# Patient Record
Sex: FEMALE | Race: WHITE | NOT HISPANIC OR LATINO | Employment: FULL TIME | URBAN - METROPOLITAN AREA
[De-identification: names, ages, dates, MRNs, and addresses within clinical notes are randomized per-mention and may not be internally consistent; named-entity substitution may affect disease eponyms.]

---

## 2023-10-03 ENCOUNTER — ANCILLARY PROCEDURE (OUTPATIENT)
Dept: RADIOLOGY | Facility: CLINIC | Age: 48
End: 2023-10-03
Payer: COMMERCIAL

## 2023-10-03 ENCOUNTER — OFFICE VISIT (OUTPATIENT)
Dept: ORTHOPEDIC SURGERY | Facility: CLINIC | Age: 48
End: 2023-10-03
Payer: COMMERCIAL

## 2023-10-03 VITALS — HEIGHT: 66 IN | BODY MASS INDEX: 47.09 KG/M2 | WEIGHT: 293 LBS

## 2023-10-03 DIAGNOSIS — M25.561 RIGHT KNEE PAIN: ICD-10-CM

## 2023-10-03 DIAGNOSIS — M17.12 PRIMARY OSTEOARTHRITIS OF LEFT KNEE: ICD-10-CM

## 2023-10-03 DIAGNOSIS — M25.561 RIGHT KNEE PAIN: Primary | ICD-10-CM

## 2023-10-03 PROCEDURE — 73562 X-RAY EXAM OF KNEE 3: CPT | Mod: RT

## 2023-10-03 PROCEDURE — 73560 X-RAY EXAM OF KNEE 1 OR 2: CPT | Mod: RIGHT SIDE | Performed by: RADIOLOGY

## 2023-10-03 PROCEDURE — 1036F TOBACCO NON-USER: CPT | Performed by: FAMILY MEDICINE

## 2023-10-03 PROCEDURE — 73562 X-RAY EXAM OF KNEE 3: CPT | Mod: RIGHT SIDE | Performed by: RADIOLOGY

## 2023-10-03 PROCEDURE — 99204 OFFICE O/P NEW MOD 45 MIN: CPT | Performed by: FAMILY MEDICINE

## 2023-10-03 RX ORDER — MELOXICAM 15 MG/1
15 TABLET ORAL DAILY
Qty: 30 TABLET | Refills: 0 | Status: SHIPPED | OUTPATIENT
Start: 2023-10-03 | End: 2023-11-07 | Stop reason: SDUPTHER

## 2023-10-03 NOTE — PROGRESS NOTES
History of Present Illness   Chief Complaint   Patient presents with    Right Knee - Pain       The patient is 47 y.o. female here with a complaint of right knee pain.  Patient says initial onset of symptoms around 6 months ago when she fell down some stairs landing on her knee, did not seek any medical attention at that time, says symptoms were more mild, tolerable.  Around 2 weeks ago she was at her daughter's senior night, says she was bending down as she stood up she felt a more sharp severe pain over the medial aspect of her knee which has persisted, she has been taking Advil for pain control with mild improvement.  She denies any swelling, she does admit to some popping/clicking, feels slightly unstable.  She denies any history of any significant right knee problems in the past, does have some ongoing issues with her left knee but says now her right knee is more bothersome than her left.  Patient works a desk job, she gets pain, stiffness when sitting for prolonged periods going to a standing position.  She does have some difficulty with stairs    No past medical history on file.    Medication Documentation Review Audit    **Prior to Admission medications have not yet been reviewed**         No Known Allergies    Social History     Socioeconomic History    Marital status:      Spouse name: Not on file    Number of children: Not on file    Years of education: Not on file    Highest education level: Not on file   Occupational History    Not on file   Tobacco Use    Smoking status: Never    Smokeless tobacco: Never   Substance and Sexual Activity    Alcohol use: Not on file     Comment: rarely    Drug use: Never    Sexual activity: Not on file   Other Topics Concern    Not on file   Social History Narrative    Not on file     Social Determinants of Health     Financial Resource Strain: Not on file   Food Insecurity: Not on file   Transportation Needs: Not on file   Physical Activity: Not on file    Stress: Not on file   Social Connections: Not on file   Intimate Partner Violence: Not on file   Housing Stability: Not on file       No past surgical history on file.       Review of Systems   GENERAL: Negative  GI: Negative  MUSCULOSKELETAL: See HPI  SKIN: Negative  NEURO:  Negative     Physical Exam:    General/Constitutional: well appearing, no distress, appears stated age.  Obese.  HEENT: sclera clear  Respiratory: non labored breathing  Vascular: No edema, swelling or tenderness, except as noted in detailed exam.  Integumentary: No impressive skin lesions present, except as noted in detailed exam.  Neurological:  Alert and oriented   Psychological:  Normal mood and affect.  Musculoskeletal: Normal, except as noted in detailed exam and in HPI.  Antalgic gait, unassisted    Right knee: Normal appearance, no swelling, no redness or warmth, no joint effusion is present.  There is some focal medial joint line tenderness to palpation.  Range of motion from 0 to 120 degrees with pain at endrange of flexion.  Positive Yury with medial discomfort.  Negative Lachman, negative posterior drawer.  Stable to varus and valgus stress    Left knee: Normal appearance, no swelling, mild medial joint tenderness palpation.  Range of motion from 0 to 120 degrees with out pain, negative Childs, negative Lachman, negative posterior drawer           Imaging  X-rays of right knee obtained today and independently reviewed, there is some mild medial joint space narrowing on the right knee, no acute abnormalities are seen, AP view of left knee shows some moderate to advanced medial compartment predominant degenerative changes with joint space narrowing, osteophytosis     Assessment   1. Right knee pain  XR knee right 3 views    Referral to Physical Therapy    meloxicam (Mobic) 15 mg tablet      2. Primary osteoarthritis of left knee  Referral to Physical Therapy        Right knee pain thought to be potential aggravation of  underlying mild osteoarthritis versus consideration of medial meniscus tear.  Mild left knee discomfort likely secondary to moderate to advanced underlying osteoarthritis    Plan  Discussed diagnosis, further work-up and treatment.  For the right knee she was interested in conservative management for the time being, referral to physical therapy was placed for her right knee as well as some chronic ongoing left knee pain.  She was given a prescription for meloxicam to assist in pain control, she will stop all other NSAIDs.  She will modify activities based on symptoms.  We will plan for follow-up in 6 to 8 weeks.  If ongoing right knee pain could consider MRI for further evaluation of medial meniscus.  Could also consider injection therapy for ongoing pain.

## 2023-11-02 ENCOUNTER — EVALUATION (OUTPATIENT)
Dept: PHYSICAL THERAPY | Facility: CLINIC | Age: 48
End: 2023-11-02
Payer: COMMERCIAL

## 2023-11-02 DIAGNOSIS — M25.562 PAIN IN BOTH KNEES, UNSPECIFIED CHRONICITY: Primary | ICD-10-CM

## 2023-11-02 DIAGNOSIS — M25.561 RIGHT KNEE PAIN: ICD-10-CM

## 2023-11-02 DIAGNOSIS — M17.12 PRIMARY OSTEOARTHRITIS OF LEFT KNEE: ICD-10-CM

## 2023-11-02 DIAGNOSIS — M25.561 PAIN IN BOTH KNEES, UNSPECIFIED CHRONICITY: Primary | ICD-10-CM

## 2023-11-02 PROCEDURE — 97161 PT EVAL LOW COMPLEX 20 MIN: CPT | Mod: GP | Performed by: PHYSICAL THERAPIST

## 2023-11-02 PROCEDURE — 97110 THERAPEUTIC EXERCISES: CPT | Mod: GP | Performed by: PHYSICAL THERAPIST

## 2023-11-02 ASSESSMENT — ENCOUNTER SYMPTOMS
DEPRESSION: 0
OCCASIONAL FEELINGS OF UNSTEADINESS: 0
LOSS OF SENSATION IN FEET: 0

## 2023-11-02 ASSESSMENT — PAIN SCALES - GENERAL: PAINLEVEL_OUTOF10: 1

## 2023-11-02 ASSESSMENT — PATIENT HEALTH QUESTIONNAIRE - PHQ9
SUM OF ALL RESPONSES TO PHQ9 QUESTIONS 1 AND 2: 0
2. FEELING DOWN, DEPRESSED OR HOPELESS: NOT AT ALL
1. LITTLE INTEREST OR PLEASURE IN DOING THINGS: NOT AT ALL

## 2023-11-02 NOTE — PROGRESS NOTES
"Physical Therapy    Physical Therapy Lower Extremity Evaluation    Patient Name: Kasey Johnson  MRN: [id number]  Today's Date: 11/2/2023  Time Calculation  Start Time: 1458  Stop Time: 1532  Time Calculation (min): 34 min    Current Problem  Problem List Items Addressed This Visit             ICD-10-CM    Bilateral knee pain - Primary M25.561, M25.562    Relevant Orders    Follow Up In Physical Therapy     Other Visit Diagnoses         Codes    Right knee pain     M25.561    Primary osteoarthritis of left knee     M17.12               Precautions  Precautions  Precautions Comment: None       Pain  Pain Score: 1  Pain at worst: 7/10    SUBJECTIVE:   Chief complaint:  Bilateral knee pain.  Onset about 7 mo ago after falling down stairs and landing on her R knee. She fell down 8 stairs.   She then had flare up after bending down and standing up in which she experienced a sharp pain   She was dx with a bakers cyst in L knee. Twisted knee while in [place] years [place]     Knee pain progressively worsened.   Notes about 1 month ago she was not able to walk on it  +warmth, tightness in R knee   +bilateral knee swelling   +Clicking R knee with pain   Pain feels like it is \"in\" the knee   Recommended injection in knee by MD but stated she needed to try PT first    Aggravating factors:  Driving-particularly stopping quickly  WB  Difficulty finding comfortable position at night    Alleviating factors:  Pain medication-Meloxicam     Imaging:  XR R knee 10/3/23:    FINDINGS:   Right knee:   No acute fracture or malalignment.   Mild medial compartment osteoarthrosis with joint space loss and   osteophytes noted. Small knee joint effusion.       AP view radiograph of the left knee demonstrates moderate to severe   medial and mild lateral compartment degenerative changes.         1. Mild medial compartment osteoarthrosis of the right knee. Small   right knee joint effusion.   2. Moderate to severe medial and mild lateral compartment "   osteoarthrosis of the left knee.     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     Patients goal:  Walk without pain     Prior tx:  None     OBJECTIVE:    Lower Extremity ROM: (WNL unless documented below)   ROM in Degrees  RIGHT LEFT   Knee Extension 0 0   Knee Flexion 121 132     Lower Extremity Strength: (WNL unless documented below)   MMT 5/5 max  RIGHT LEFT   Knee Extension 4- with pain  4+   Knee Flexion 4 5     Lower Extremity Flexibility: (WNL unless documented below)   Flexibility  RIGHT LEFT   Quad     Hamstring  25* pain  20*   Hip flexor      Piriformis      ITB      Gastroc  Mod loss  Mod loss   Soleus  Mod loss Mod loss      +Click in R knee with pain   +R retropatellar crepitus with knee ROM     Gait mechanics: antalgic     Special tests: (WNL unless documented below)     KNEE RIGHT LEFT   Yury's     Thessaly's  + -   Joint Line Tenderness - -   End Range Flexion +    End Range Extension  +    Anterior Drawer      Posterior Drawer     Valgus Stress      Varus Stress         TREATMENT:  Initial evaluation completed. Issued and reviewed HEP with pt that included:  Access Code: [email or web address]  URL: [email or web address]  Date: 11/02/2023  Prepared by: Vangie Russo    Exercises  - Supine Quad Set  - 1 x daily - 7 x weekly - 2-3 sets - 10 reps - 3 seconds  hold  - Active Straight Leg Raise with Quad Set  - 1 x daily - 7 x weekly - 2-3 sets - 10 reps  - Supine Heel Slide  - 1 x daily - 7 x weekly - 2-3 sets - 10 reps  - Seated Long Arc Quad (90-45 Degree Range)  - 1 x daily - 7 x weekly - 2-3 sets - 10 reps  - Seated Hamstring Stretch  - 1 x daily - 7 x weekly - 2 reps - 30 seconds hold    Outcome Measure:  Other Measures  Lower Extremity  Funtional Score (LEFS): 32/80    ASSESSEMENT  The pt presents with signs and symptoms consistent with the physical therapy diagnosis of R knee pain.   Pt demonstrates decreased ROM R>L with reported pain. She also has decreased strength with reported pain on R>L. +Tests and measures consistent with possible R meniscal px. Pt is concerned with intensity and frequency of pain. The pt will benefit from skilled physical therapy to reduce impairments in order to return to prior level of function, reduce pain, increase strength and ROM and restore gait/balance.     The physical therapy prognosis is good for the patient to achieve their goals.   The pt tolerated therapy treatment today well with no adverse effects.  Barriers to therapy include:  none     PLAN  The pt will be seen 1-2 time(s) a week for 6-8 weeks.      The pt has been educated about the risks and benefits of physical therapy including manual therapy treatments and gives consent for treatment.     The patient will benefit from physical therapy treatment to include: therapeutic exercises, therapeutic activities, neurological re-education, manual therapy, modalities, and a home exercise program.     Goals:  Active       PT Problem       Reduce pain at worst to 3/10 with all prior activity.        Start:  11/02/23    Expected End:  01/31/24            Increase ROM/flexibility to WFL to perform daily functional activities including amb, transfers, stair negotiation        Start:  11/02/23    Expected End:  01/31/24            Increase by > or = 1/2 mm grade to improve transfers, stair negotiation, gait mechanics without increased pain/compensation         Start:  11/02/23    Expected End:  01/31/24            Pt to score an increase of 10 points or > on LEFS to display overall increased function.         Start:  11/02/23    Expected End:  01/31/24            Patient will demonstrate independence in home program for support of progression       Start:  11/02/23     Expected End:  01/31/24

## 2023-11-02 NOTE — LETTER
November 2, 2023     Patient: Kasey Johnson   YOB: 1975   Date of Visit: 11/2/2023       To Whom It May Concern:    It is my medical opinion that Kasey Johnson {Work release (duty restriction):87828}.    If you have any questions or concerns, please don't hesitate to call.         Sincerely,        Vangie Russo, PT    CC: No Recipients

## 2023-11-02 NOTE — LETTER
November 2, 2023     Patient: Kasey Johnson   YOB: 1975   Date of Visit: 11/2/2023       To Whom it May Concern:    Kasey Johnson was seen in my clinic on 11/2/2023. She {Return to school/sport:60036}.    If you have any questions or concerns, please don't hesitate to call.         Sincerely,          Vangie Russo, PT        CC: No Recipients

## 2023-11-06 ENCOUNTER — TELEPHONE (OUTPATIENT)
Dept: ORTHOPEDIC SURGERY | Facility: CLINIC | Age: 48
End: 2023-11-06
Payer: COMMERCIAL

## 2023-11-07 DIAGNOSIS — M25.561 RIGHT KNEE PAIN: ICD-10-CM

## 2023-11-07 RX ORDER — MELOXICAM 15 MG/1
15 TABLET ORAL DAILY
Qty: 30 TABLET | Refills: 0 | Status: SHIPPED | OUTPATIENT
Start: 2023-11-07 | End: 2023-12-26

## 2023-11-08 ENCOUNTER — TREATMENT (OUTPATIENT)
Dept: PHYSICAL THERAPY | Facility: CLINIC | Age: 48
End: 2023-11-08
Payer: COMMERCIAL

## 2023-11-08 DIAGNOSIS — M25.562 PAIN IN BOTH KNEES, UNSPECIFIED CHRONICITY: ICD-10-CM

## 2023-11-08 DIAGNOSIS — M25.561 PAIN IN BOTH KNEES, UNSPECIFIED CHRONICITY: ICD-10-CM

## 2023-11-08 PROCEDURE — 97110 THERAPEUTIC EXERCISES: CPT | Mod: GP,CQ

## 2023-11-08 ASSESSMENT — PAIN SCALES - GENERAL: PAINLEVEL_OUTOF10: 2

## 2023-11-08 ASSESSMENT — PAIN DESCRIPTION - DESCRIPTORS: DESCRIPTORS: ACHING

## 2023-11-08 ASSESSMENT — PAIN - FUNCTIONAL ASSESSMENT: PAIN_FUNCTIONAL_ASSESSMENT: 0-10

## 2023-11-08 NOTE — PROGRESS NOTES
"Physical Therapy Treatment    Patient Name: Kasey Johnson  MRN: [id number]  Today's Date: 11/8/2023  Time Calculation  Start Time: 1005  Stop Time: 1045  Time Calculation (min): 40 min    Current Problem:  Problem List Items Addressed This Visit             ICD-10-CM    Bilateral knee pain M25.561, M25.562       Subjective   General:   Pt has been at an conference for 2 days so it was a lot of standing/walking/sitting for long hrs. Did not sleep last night d/t knee pain.  R knee pops and niall occasionally and is painful when it does. However, feels better after it pops.   Pain:  Pain Assessment: 0-10  Pain Score: 2  Pain Location: Knee  Pain Descriptors: Aching    Precautions:  Precautions  Precautions Comment: None      Objective   No objective measures taken this visit    Treatment:  Therapeutic exercise  Scifit x5 min   Calf stretch on fitter board x 1 min  BL HSS x 1 min, seated  BL TKE GTB 2x10   R fwd step up 2 B UE support 2x10, 2\"  L step up B UE support 2x10, 6''  L step down B UE support 6'' 2x10 increase height next visit   R step down B UE support 2'' x10   Walking along railing with correct gait x 5 min     Neuromuscular Re-education       Manual       Modalities  Will ice at home    Assessment   Pt very hesitant to perform all exercises d/t fear of R knee pain/buckling. Very hesitant to flex knee with exercises, requiring encouragement from therapist and B UE support. Pt voiced feeling \"pop\" in R knee when performing step ups but voiced relief following. All exercises performed very slowly but able to complete.     Plan    Continue to progress POC as tolerated by patient to improve strength, mobility and overall function  Trial k tape?    Goals:  Active       PT Problem       Reduce pain at worst to 3/10 with all prior activity.        Start:  11/02/23    Expected End:  01/31/24            Increase ROM/flexibility to WFL to perform daily functional activities including amb, transfers, stair " negotiation        Start:  11/02/23    Expected End:  01/31/24            Increase by > or = 1/2 mm grade to improve transfers, stair negotiation, gait mechanics without increased pain/compensation         Start:  11/02/23    Expected End:  01/31/24            Pt to score an increase of 10 points or > on LEFS to display overall increased function.         Start:  11/02/23    Expected End:  01/31/24            Patient will demonstrate independence in home program for support of progression       Start:  11/02/23    Expected End:  01/31/24

## 2023-11-16 ENCOUNTER — DOCUMENTATION (OUTPATIENT)
Dept: PHYSICAL THERAPY | Facility: CLINIC | Age: 48
End: 2023-11-16
Payer: COMMERCIAL

## 2023-11-16 ENCOUNTER — ANCILLARY PROCEDURE (OUTPATIENT)
Dept: RADIOLOGY | Facility: CLINIC | Age: 48
End: 2023-11-16
Payer: COMMERCIAL

## 2023-11-16 ENCOUNTER — APPOINTMENT (OUTPATIENT)
Dept: PHYSICAL THERAPY | Facility: CLINIC | Age: 48
End: 2023-11-16
Payer: COMMERCIAL

## 2023-11-16 DIAGNOSIS — Z12.31 SCREENING MAMMOGRAM FOR BREAST CANCER: ICD-10-CM

## 2023-11-16 PROCEDURE — 77063 BREAST TOMOSYNTHESIS BI: CPT

## 2023-11-16 PROCEDURE — 77067 SCR MAMMO BI INCL CAD: CPT | Performed by: RADIOLOGY

## 2023-11-16 PROCEDURE — 77063 BREAST TOMOSYNTHESIS BI: CPT | Performed by: RADIOLOGY

## 2023-11-16 NOTE — PROGRESS NOTES
"Physical Therapy                 Therapy Communication Note    Patient Name: Kasey Johnson \"Louisa\"  MRN: [id number]  Today's Date: 11/16/2023     Discipline: Physical Therapy    Missed Visit Reason:      Missed Time: Cancel    Comment:  "

## 2023-11-22 ENCOUNTER — TREATMENT (OUTPATIENT)
Dept: PHYSICAL THERAPY | Facility: CLINIC | Age: 48
End: 2023-11-22
Payer: COMMERCIAL

## 2023-11-22 ENCOUNTER — HOSPITAL ENCOUNTER (OUTPATIENT)
Dept: RADIOLOGY | Facility: EXTERNAL LOCATION | Age: 48
Discharge: HOME | End: 2023-11-22

## 2023-11-22 DIAGNOSIS — M25.562 PAIN IN BOTH KNEES, UNSPECIFIED CHRONICITY: ICD-10-CM

## 2023-11-22 DIAGNOSIS — M25.561 PAIN IN BOTH KNEES, UNSPECIFIED CHRONICITY: ICD-10-CM

## 2023-11-22 PROCEDURE — 97110 THERAPEUTIC EXERCISES: CPT | Mod: GP | Performed by: PHYSICAL THERAPIST

## 2023-11-22 ASSESSMENT — PAIN SCALES - GENERAL: PAINLEVEL_OUTOF10: 3

## 2023-11-22 NOTE — PROGRESS NOTES
"Physical Therapy Treatment    Patient Name: Kasey Johnson  MRN: [id number]  Today's Date: 11/22/2023  Time Calculation  Start Time: 0832  Stop Time: 0915  Time Calculation (min): 43 min    Current Problem:  Problem List Items Addressed This Visit             ICD-10-CM    Bilateral knee pain M25.561, M25.562         Subjective   General:   Pt reports that she experienced a couple of rough days last week with walking. She has been using heating pad which helps sometime. She wonders when the best time to take her anti inflammatories would be. Pain was a 5/10 last night.   She has been mindful to avoid limping as much as possible.     Pain:  Pain Score: 3    Precautions:  Precautions  Precautions Comment: None      Objective   No objective measures taken this visit    Treatment:  Therapeutic exercise  Scifit x5 min   Calf stretch on fitter board x 1 min  BL HSS x 1 min, standing on 1st step   BL TKE GTB 2x10   BL HS curl GTB 2x10  R fwd step up 2 B UE support 2x10, 2\"  L step up B UE support 2x10, 6''  Sit to stand raised plinth BTB 2x10    Held:   L step down B UE support 6'' 2x10 increase height next visit   R step down B UE support 2'' x10   Walking along railing with correct gait x 5 min     Neuromuscular Re-education       Manual       Modalities  Will ice at home    Assessment   Improved tolerance with exercises today. More challenge with R HS curl compared to L.   She noted some increased pain in R knee compared to L after her session.     Plan    Continue to progress POC as tolerated by patient to improve strength, mobility and overall function  Trial k tape?    Goals:  Active       PT Problem       Reduce pain at worst to 3/10 with all prior activity.        Start:  11/02/23    Expected End:  01/31/24            Increase ROM/flexibility to WFL to perform daily functional activities including amb, transfers, stair negotiation        Start:  11/02/23    Expected End:  01/31/24            Increase by > or = 1/2 mm " grade to improve transfers, stair negotiation, gait mechanics without increased pain/compensation         Start:  11/02/23    Expected End:  01/31/24            Pt to score an increase of 10 points or > on LEFS to display overall increased function.         Start:  11/02/23    Expected End:  01/31/24            Patient will demonstrate independence in home program for support of progression       Start:  11/02/23    Expected End:  01/31/24

## 2023-11-29 ENCOUNTER — TREATMENT (OUTPATIENT)
Dept: PHYSICAL THERAPY | Facility: CLINIC | Age: 48
End: 2023-11-29
Payer: COMMERCIAL

## 2023-11-29 DIAGNOSIS — M25.562 PAIN IN BOTH KNEES, UNSPECIFIED CHRONICITY: ICD-10-CM

## 2023-11-29 DIAGNOSIS — M25.561 PAIN IN BOTH KNEES, UNSPECIFIED CHRONICITY: ICD-10-CM

## 2023-11-29 PROCEDURE — 97110 THERAPEUTIC EXERCISES: CPT | Mod: GP | Performed by: PHYSICAL THERAPIST

## 2023-11-29 ASSESSMENT — PAIN SCALES - GENERAL: PAINLEVEL_OUTOF10: 2

## 2023-11-29 NOTE — PROGRESS NOTES
"Physical Therapy Treatment    Patient Name: Kasey Johnson  MRN: [id number]  Today's Date: 11/29/2023  Time Calculation  Start Time: 0832  Stop Time: 0913  Time Calculation (min): 41 min    Current Problem:  Problem List Items Addressed This Visit             ICD-10-CM    Bilateral knee pain M25.561, M25.562           Subjective   General:   Pt reports that her knees were bad on Friday after going [employer] Shopping.   Pain gradually worsens towards the end of the day.   She noted challenge getting off of couch while at her moms house. It was too low.     Pain:  Pain Score: 2    Precautions:  Precautions  Precautions Comment: None      Objective   No objective measures taken this visit    Treatment:  Therapeutic exercise  Scifit x5 min   Calf stretch on fitter board x 1 min  BL HSS x 1 min, standing on 1st step   BL TKE GTB 2x10   BL HS curl GTB 2x10  R fwd step up 2\" UE support 2x10  L step up 6\" UE support 2x10  Sit to stand raised plinth BTB 2x10  LAQ YTB 2x10-mid range   HR 1/2 foam x 10   TR 1/2 foam-discontinued due to painful popping   TR level surface 2x10     Held:   L step down B UE support 6'' 2x10 increase height next visit   R step down B UE support 2'' x10   Walking along railing with correct gait x 5 min       Modalities  Will ice at home    Assessment   Increased challenge with R HS curl compared to L. She noted painful pop in R knee when getting on 1/2 foam roll to perform TR. This was modified to level surface which she tolerated much better. Great job overall with progressions. I am encouraged by her ability to tolerated some progression each visit.     Plan    Continue to progress POC as tolerated by patient to improve strength, mobility and overall function    Goals:  Active       PT Problem       Reduce pain at worst to 3/10 with all prior activity.        Start:  11/02/23    Expected End:  01/31/24            Increase ROM/flexibility to WFL to perform daily functional activities including " amb, transfers, stair negotiation        Start:  11/02/23    Expected End:  01/31/24            Increase by > or = 1/2 mm grade to improve transfers, stair negotiation, gait mechanics without increased pain/compensation         Start:  11/02/23    Expected End:  01/31/24            Pt to score an increase of 10 points or > on LEFS to display overall increased function.         Start:  11/02/23    Expected End:  01/31/24            Patient will demonstrate independence in home program for support of progression       Start:  11/02/23    Expected End:  01/31/24

## 2023-12-04 DIAGNOSIS — M25.561 RIGHT KNEE PAIN: ICD-10-CM

## 2023-12-04 RX ORDER — MELOXICAM 15 MG/1
15 TABLET ORAL DAILY
Qty: 30 TABLET | Refills: 0 | OUTPATIENT
Start: 2023-12-04

## 2023-12-07 ENCOUNTER — OFFICE VISIT (OUTPATIENT)
Dept: ORTHOPEDIC SURGERY | Facility: CLINIC | Age: 48
End: 2023-12-07
Payer: COMMERCIAL

## 2023-12-07 DIAGNOSIS — M25.561 RIGHT KNEE PAIN, UNSPECIFIED CHRONICITY: ICD-10-CM

## 2023-12-07 DIAGNOSIS — M17.12 PRIMARY OSTEOARTHRITIS OF LEFT KNEE: Primary | ICD-10-CM

## 2023-12-07 PROCEDURE — 99213 OFFICE O/P EST LOW 20 MIN: CPT | Performed by: FAMILY MEDICINE

## 2023-12-07 PROCEDURE — 1036F TOBACCO NON-USER: CPT | Performed by: FAMILY MEDICINE

## 2023-12-07 NOTE — PROGRESS NOTES
History of Present Illness   Chief Complaint   Patient presents with    Right Knee - Follow-up       The patient is 48 y.o. female here for follow-up of mostly right knee pain with some also ongoing mild chronic left knee pain.  Patient was seen by me 2 months ago, see previous note for full details.  In brief patient had subacute onset of right knee pain around 8 months ago after a fall down some stairs, did not seek any immediate medical attention for this.  Also had some ongoing more chronic left knee pain that she had become accustomed to, x-rays at our initial visit showed some moderate to advanced degenerative changes of her left knee, more mild degenerative changes of her right knee, she had some focal medial joint tenderness of the right knee, there were some concern for possible meniscus tear contributing to her pain on the right, pain on the left thought to be secondary to arthritis.  Patient was interested in conservative management, she was given a prescription for meloxicam as well as a referral to physical therapy which she has been doing, overall she has seen improvement in symptoms in both knees, still having pain more prominent on the right but overall says she feels like she is getting stronger, going up and down stairs better.  She denies any new injuries or symptoms.        History reviewed. No pertinent past medical history.    Medication Documentation Review Audit       Reviewed by Roberth Charles MA (Medical Assistant) on 12/07/23 at 1256      Medication Order Taking? Sig Documenting Provider Last Dose Status   meloxicam (Mobic) 15 mg tablet [id number]  Take 1 tablet (15 mg) by mouth once daily. Tez Henrdon MD  Active                    No Known Allergies    Social History     Socioeconomic History    Marital status:      Spouse name: Not on file    Number of children: Not on file    Years of education: Not on file    Highest education level: Not on file   Occupational  History    Not on file   Tobacco Use    Smoking status: Never    Smokeless tobacco: Never   Substance and Sexual Activity    Alcohol use: Not on file     Comment: rarely    Drug use: Never    Sexual activity: Not on file   Other Topics Concern    Not on file   Social History Narrative    Not on file     Social Determinants of Health     Financial Resource Strain: Not on file   Food Insecurity: Not on file   Transportation Needs: Not on file   Physical Activity: Not on file   Stress: Not on file   Social Connections: Not on file   Intimate Partner Violence: Not on file   Housing Stability: Not on file       History reviewed. No pertinent surgical history.       Review of Systems   GENERAL: Negative  GI: Negative  MUSCULOSKELETAL: See HPI  SKIN: Negative  NEURO:  Negative     Physical Exam:    General/Constitutional: well appearing, no distress, appears stated age.  Obese.  HEENT: sclera clear  Respiratory: non labored breathing  Vascular: No edema, swelling or tenderness, except as noted in detailed exam.  Integumentary: No impressive skin lesions present, except as noted in detailed exam.  Neurological:  Alert and oriented   Psychological:  Normal mood and affect.  Musculoskeletal: Normal, except as noted in detailed exam and in HPI.  Antalgic gait, unassisted    Right knee: Normal appearance, no swelling, no redness or warmth, no joint effusion is present.  There is some focal medial joint line tenderness to palpation.  Range of motion from 0 to 120 degrees with pain at endrange of flexion.  Positive Yury with medial discomfort.  Negative Lachman, negative posterior drawer.  Stable to varus and valgus stress    Left knee: Normal appearance, no swelling, mild medial joint tenderness palpation.  Range of motion from 0 to 120 degrees with out pain, negative Childs, negative Lachman, negative posterior drawer           Imaging  No new imaging today     Assessment   1. Primary osteoarthritis of left knee        2.  Right knee pain, unspecified chronicity            Right knee pain thought to be potential aggravation of underlying mild osteoarthritis versus consideration of medial meniscus tear.  Mild left knee discomfort likely secondary to moderate to advanced underlying osteoarthritis.  Improvement in both over the past 2 months with meloxicam and physical therapy but still having some discomfort more prominent on the right    Plan  Discussed further workup and treatment.  We discussed role of continued conservative management with more physical therapy, we discussed role of injections for knee pain, for the right knee specifically we discussed possibility of MRI for evaluation of medial meniscus tear given only mild degenerative changes seen on x-ray.  She was interested in continuing physical therapy and meloxicam though she understands that she does not want to be on this medication long-term, we discussed the risks of this.  She will consider possible injection if she sees a plateau in her improvement or worsening pain, would also consider MRI for the right knee.  At this point she will plan to follow-up as symptoms dictate

## 2024-01-22 ENCOUNTER — DOCUMENTATION (OUTPATIENT)
Dept: PHYSICAL THERAPY | Facility: CLINIC | Age: 49
End: 2024-01-22
Payer: COMMERCIAL

## 2024-01-22 NOTE — PROGRESS NOTES
"Physical Therapy    Discharge Summary    Name: Kasey Johnson \"Louisa\"  MRN: [id number]  : 1975  Date: 24    Discharge Summary: PT    Discharge Information: Date of discharge 24, Date of last visit 23, Date of evaluation 23, Number of attended visits 4, Referred by Dr. Herndon, and Referred for bilateral knee pain       Discharge Status: Unknown. Pt has not returned to PT at this time.      Rehab Discharge Reason: Other Pt has not returned to PT at this time   "